# Patient Record
Sex: MALE | Race: WHITE
[De-identification: names, ages, dates, MRNs, and addresses within clinical notes are randomized per-mention and may not be internally consistent; named-entity substitution may affect disease eponyms.]

---

## 2019-10-08 ENCOUNTER — HOSPITAL ENCOUNTER (OUTPATIENT)
Dept: HOSPITAL 92 - TBSIIMAG | Age: 44
Discharge: HOME | End: 2019-10-08
Attending: NEUROLOGICAL SURGERY
Payer: COMMERCIAL

## 2019-10-08 DIAGNOSIS — M54.5: Primary | ICD-10-CM

## 2019-10-08 DIAGNOSIS — M47.816: ICD-10-CM

## 2019-10-08 PROCEDURE — 72100 X-RAY EXAM L-S SPINE 2/3 VWS: CPT

## 2019-10-08 NOTE — RAD
XR Lumbar Spine 2 Or 3 View



HISTORY: Low back pain.



COMPARISON: None.



FINDINGS: The vertebral bodies are normal in height. Small osteophytes are seen along the course of t
he spine with some mild disc narrowing at L4-5 and minimal disc narrowing at L5-S1. No

spondylolisthesis.



IMPRESSION: Mild arthritic changes of the spine.



Reported By: Mike Naranjo 

Electronically Signed:  10/8/2019 10:25 AM

## 2019-10-22 NOTE — HP
HISTORY OF PRESENT ILLNESS:  This is a  44 year old male who reports to the office

for evaluation of low back and right leg pain.  The patient states that he has had

low back pain for about 5 years, in the past injections and exercise have helped.

About a year and a half ago, his pain started getting worse.  He started having pain

running down the back of his right leg to his ankle and the lateral part of his

right foot.  He states that recently he started to have pins and needles sensation

in the same distribution.  There are no left-sided symptoms.  He has had 3

injections with no significant benefit and he just finished a round of physical

therapy that did not give any lasting relief.  He has tried anti-inflammatory,

steroids, Tylenol No. 3, and tramadol. 



REVIEW OF SYSTEMS:  A 10-point review of systems has been completed and is negative

other than stated in the above HPI. 



PAST MEDICAL HISTORY:  Allergies, migraines.



PAST SURGICAL HISTORY:  Lasix surgery and meniscectomy.



FAMILY HISTORY:  Father is alive, diagnosed with no disease.  Mother is alive with

asthma, thyroid disease, diabetes, and hypertension. 



SOCIAL HISTORY:  The patient is a nonsmoker.  Denies drug use.  Uses alcohol

occasionally. 



MEDICATIONS:  

1. Tramadol.

2. Gabapentin.

3. Maxalt.



ALLERGIES:  AMOXICILLIN AND SULFA DRUGS.



PHYSICAL EXAMINATION:

CONSTITUTIONAL:  Well appearing, well nourished, alert. 

RESPIRATIONS: Normal work of breathing on room air. 

NEUROLOGIC:  Alert and oriented x3.  Speech, spontaneous and fluent.  Normal fund of

knowledge.  Cranial nerves grossly intact. 

EXTREMITIES:  Lower extremities 5/5 bilateral strength, hip flexion, knee flexion,

knee extension, dorsiflexion, plantar flexion, EHL, right S1 radiculopathy.

Positive single leg raise.  Right hip rotation normal bilaterally.  Nontender to

palpate lumbar spine. 

Deep tendon reflexes 2+ patellar, 2+ Achilles.  Negative Babinski.  No clonus. 

SENSORY:  Decreased sensation, right lateral ankle. 

Gait and station, sit to stand normal.  Normal gait.  Heel and toe walk normal.



IMAGING DATA:  Lumbar MRI shows L5-S1 herniated nucleus pulposus on the right side.



ASSESSMENT AND PLAN:  Lumbar radiculopathy due to herniated nucleus pulposus.  Dr. Toussaint has offered to do a right-sided microdiskectomy L5-S1.  The patient states

that he understands the risks of surgery and is willing to proceed. 







Job ID:  [id number]

## 2019-10-23 ENCOUNTER — HOSPITAL ENCOUNTER (OUTPATIENT)
Dept: HOSPITAL 92 - LABBT | Age: 44
Discharge: HOME | End: 2019-10-23
Attending: NEUROLOGICAL SURGERY
Payer: COMMERCIAL

## 2019-10-23 DIAGNOSIS — M51.26: ICD-10-CM

## 2019-10-23 DIAGNOSIS — Z01.812: Primary | ICD-10-CM

## 2019-10-23 LAB
APTT PPP: 27 SEC (ref 22.9–36.1)
HGB BLD-MCNC: 15.7 G/DL (ref 14–18)
INR PPP: 1
MCH RBC QN AUTO: 31.3 PG (ref 27–31)
MCV RBC AUTO: 93.1 FL (ref 78–98)
PLATELET # BLD AUTO: 243 THOU/UL (ref 130–400)
PROTHROMBIN TIME: 13.2 SEC (ref 12–14.7)
RBC # BLD AUTO: 5.01 MILL/UL (ref 4.7–6.1)
WBC # BLD AUTO: 8.3 THOU/UL (ref 4.8–10.8)

## 2019-10-23 PROCEDURE — 85610 PROTHROMBIN TIME: CPT

## 2019-10-23 PROCEDURE — 85027 COMPLETE CBC AUTOMATED: CPT

## 2019-10-23 PROCEDURE — 85730 THROMBOPLASTIN TIME PARTIAL: CPT

## 2019-10-24 ENCOUNTER — HOSPITAL ENCOUNTER (OUTPATIENT)
Dept: HOSPITAL 92 - SDC | Age: 44
Discharge: HOME | End: 2019-10-24
Attending: NEUROLOGICAL SURGERY
Payer: COMMERCIAL

## 2019-10-24 VITALS — BODY MASS INDEX: 28.7 KG/M2

## 2019-10-24 DIAGNOSIS — Z88.2: ICD-10-CM

## 2019-10-24 DIAGNOSIS — Z88.0: ICD-10-CM

## 2019-10-24 DIAGNOSIS — M51.16: Primary | ICD-10-CM

## 2019-10-24 DIAGNOSIS — Z79.1: ICD-10-CM

## 2019-10-24 DIAGNOSIS — Z79.899: ICD-10-CM

## 2019-10-24 DIAGNOSIS — G43.909: ICD-10-CM

## 2019-10-24 PROCEDURE — 01N90ZZ RELEASE LUMBAR PLEXUS, OPEN APPROACH: ICD-10-PCS | Performed by: NEUROLOGICAL SURGERY

## 2019-10-24 PROCEDURE — 76000 FLUOROSCOPY <1 HR PHYS/QHP: CPT

## 2019-10-24 PROCEDURE — 0SB20ZZ EXCISION OF LUMBAR VERTEBRAL DISC, OPEN APPROACH: ICD-10-PCS | Performed by: NEUROLOGICAL SURGERY

## 2019-10-24 NOTE — OP
DATE OF PROCEDURE:  10/24/2019



ASSISTANT:  None.



PREOPERATIVE INDICATION:  Treat pain and prevent neurological deterioration.



PREOPERATIVE DIAGNOSIS:  Intervertebral disk herniation, right L5-S1, with S1

radiculopathy. 



POSTOPERATIVE DIAGNOSIS:  Intervertebral disk herniation, right L5-S1, with S1

radiculopathy. 



OPERATIVE PROCEDURE:  Right L5-S1 partial hemilaminectomy, medial facetectomy,

microdiskectomy, and operating microscope. 



PREOPERATIVE MEDICATIONS:  Clindamycin 900 mg IV and Levaquin 500 mg IV.



DRAIN NUMBER:  Zero.



DRAIN TYPE:  None.



DESCRIPTION OF PROCEDURE:  The patient was brought to the operating room.  General

endotracheal anesthesia was induced.  The patient was carefully positioned prone on

the operating table with the chest and hips supported by gel-filled chest rolls.  A

lateral fluoro radiograph was used to plan our incision.  The lumbar skin was

sterilely prepped and draped.  We opened with a 10 blade knife.  We controlled

bleeding with bipolar cautery.  We used monopolar cautery to dissect through

subcutaneous tissues to the thoracodorsal fascia.  We incised the fascia right of

the midline and reflected the paraspinal muscles off the spinous process of L5 and

the sacrum as well as the right lateral aspect of the lamina of L5 and S1.  We

placed a marker on the facet joint and took a lateral fluoro radiograph to confirm

the level upon which we were operating.  A self-retaining retractor was placed.  A

Kerrison rongeur was used to fashion our partial hemilaminectomy and medial

facetectomy at L5-S1.  The operative microscope was brought into the field. 



Under microscopic magnification and using microsurgical techniques, we carefully

removed the yellow ligament in a piecemeal fashion.  We identified the S1 nerve

root.  We performed a wide foraminotomy over the nerve root and then gently

retracted it medially.  Under the nerve root and its ventral aspect, there was

intervertebral disk herniation in the ventral epidural space.  We incised the

lateral aspect of the disk herniation and we removed a large fragment of disk as a

single piece.  We probed the ventral epidural space and found no more disk

fragments.  All of the intervertebral disks in the interspace were densely adherent

to the endplates.  We left it intact.  We irrigated copiously with bacitracin

irrigation.  We controlled epidural bleeding with gentle bipolar cautery.  We waxed

the bone edges.  We infused local anesthetic in the paraspinal muscles.  We closed

the wound in anatomical layers.  We applied a sterile dressing.  This was a clean

case, no contamination. 







Job ID:  [id number]